# Patient Record
Sex: FEMALE | Race: WHITE | NOT HISPANIC OR LATINO | URBAN - METROPOLITAN AREA
[De-identification: names, ages, dates, MRNs, and addresses within clinical notes are randomized per-mention and may not be internally consistent; named-entity substitution may affect disease eponyms.]

---

## 2018-05-16 ENCOUNTER — INPATIENT (INPATIENT)
Facility: HOSPITAL | Age: 83
LOS: 0 days | Discharge: HOSPICE | End: 2018-05-17
Attending: INTERNAL MEDICINE | Admitting: INTERNAL MEDICINE
Payer: MEDICARE

## 2018-05-16 VITALS
SYSTOLIC BLOOD PRESSURE: 146 MMHG | RESPIRATION RATE: 22 BRPM | DIASTOLIC BLOOD PRESSURE: 92 MMHG | WEIGHT: 119.93 LBS | TEMPERATURE: 101 F | HEART RATE: 95 BPM | OXYGEN SATURATION: 92 % | HEIGHT: 60 IN

## 2018-05-16 LAB
ADD ON TEST-SPECIMEN IN LAB: SIGNIFICANT CHANGE UP
ALBUMIN SERPL ELPH-MCNC: 2.5 G/DL — LOW (ref 3.3–5)
ALP SERPL-CCNC: 61 U/L — SIGNIFICANT CHANGE UP (ref 40–120)
ALT FLD-CCNC: 14 U/L — SIGNIFICANT CHANGE UP (ref 12–78)
ANION GAP SERPL CALC-SCNC: 13 MMOL/L — SIGNIFICANT CHANGE UP (ref 5–17)
APPEARANCE UR: CLEAR — SIGNIFICANT CHANGE UP
APTT BLD: 31.2 SEC — SIGNIFICANT CHANGE UP (ref 27.5–37.4)
AST SERPL-CCNC: 32 U/L — SIGNIFICANT CHANGE UP (ref 15–37)
BASOPHILS # BLD AUTO: 0 K/UL — SIGNIFICANT CHANGE UP (ref 0–0.2)
BASOPHILS NFR BLD AUTO: 0 % — SIGNIFICANT CHANGE UP (ref 0–2)
BILIRUB SERPL-MCNC: 0.7 MG/DL — SIGNIFICANT CHANGE UP (ref 0.2–1.2)
BILIRUB UR-MCNC: NEGATIVE — SIGNIFICANT CHANGE UP
BUN SERPL-MCNC: 33 MG/DL — HIGH (ref 7–23)
CALCIUM SERPL-MCNC: 9.3 MG/DL — SIGNIFICANT CHANGE UP (ref 8.5–10.1)
CHLORIDE SERPL-SCNC: 95 MMOL/L — LOW (ref 96–108)
CO2 SERPL-SCNC: 20 MMOL/L — LOW (ref 22–31)
COLOR SPEC: YELLOW — SIGNIFICANT CHANGE UP
CREAT SERPL-MCNC: 1.18 MG/DL — SIGNIFICANT CHANGE UP (ref 0.5–1.3)
DIFF PNL FLD: (no result)
ELLIPTOCYTES BLD QL SMEAR: SLIGHT — SIGNIFICANT CHANGE UP
EOSINOPHIL # BLD AUTO: 0 K/UL — SIGNIFICANT CHANGE UP (ref 0–0.5)
EOSINOPHIL NFR BLD AUTO: 0 % — SIGNIFICANT CHANGE UP (ref 0–6)
GLUCOSE SERPL-MCNC: 120 MG/DL — HIGH (ref 70–99)
GLUCOSE UR QL: NEGATIVE MG/DL — SIGNIFICANT CHANGE UP
HCT VFR BLD CALC: 42.5 % — SIGNIFICANT CHANGE UP (ref 34.5–45)
HGB BLD-MCNC: 14.8 G/DL — SIGNIFICANT CHANGE UP (ref 11.5–15.5)
INR BLD: 1.42 RATIO — HIGH (ref 0.88–1.16)
KETONES UR-MCNC: NEGATIVE — SIGNIFICANT CHANGE UP
LACTATE SERPL-SCNC: 4.4 MMOL/L — CRITICAL HIGH (ref 0.7–2)
LEUKOCYTE ESTERASE UR-ACNC: (no result)
LIDOCAIN IGE QN: 43 U/L — LOW (ref 73–393)
LYMPHOCYTES # BLD AUTO: 1.55 K/UL — SIGNIFICANT CHANGE UP (ref 1–3.3)
LYMPHOCYTES # BLD AUTO: 3 % — LOW (ref 13–44)
MANUAL SMEAR VERIFICATION: SIGNIFICANT CHANGE UP
MCHC RBC-ENTMCNC: 30.4 PG — SIGNIFICANT CHANGE UP (ref 27–34)
MCHC RBC-ENTMCNC: 34.8 GM/DL — SIGNIFICANT CHANGE UP (ref 32–36)
MCV RBC AUTO: 87.3 FL — SIGNIFICANT CHANGE UP (ref 80–100)
MONOCYTES # BLD AUTO: 1.03 K/UL — HIGH (ref 0–0.9)
MONOCYTES NFR BLD AUTO: 2 % — SIGNIFICANT CHANGE UP (ref 2–14)
NEUTROPHILS # BLD AUTO: 48.52 K/UL — HIGH (ref 1.8–7.4)
NEUTROPHILS NFR BLD AUTO: 83 % — HIGH (ref 43–77)
NEUTS BAND # BLD: 11 % — HIGH (ref 0–8)
NITRITE UR-MCNC: NEGATIVE — SIGNIFICANT CHANGE UP
NRBC # BLD: 0 /100 — SIGNIFICANT CHANGE UP (ref 0–0)
NRBC # BLD: SIGNIFICANT CHANGE UP /100 WBCS (ref 0–0)
NT-PROBNP SERPL-SCNC: 9321 PG/ML — HIGH (ref 0–450)
PH UR: 5 — SIGNIFICANT CHANGE UP (ref 5–8)
PLAT MORPH BLD: NORMAL — SIGNIFICANT CHANGE UP
PLATELET # BLD AUTO: 369 K/UL — SIGNIFICANT CHANGE UP (ref 150–400)
POIKILOCYTOSIS BLD QL AUTO: SIGNIFICANT CHANGE UP
POTASSIUM SERPL-MCNC: 5.4 MMOL/L — HIGH (ref 3.5–5.3)
POTASSIUM SERPL-SCNC: 5.4 MMOL/L — HIGH (ref 3.5–5.3)
PROT SERPL-MCNC: 7.1 GM/DL — SIGNIFICANT CHANGE UP (ref 6–8.3)
PROT UR-MCNC: 100 MG/DL
PROTHROM AB SERPL-ACNC: 15.4 SEC — HIGH (ref 9.8–12.7)
RBC # BLD: 4.87 M/UL — SIGNIFICANT CHANGE UP (ref 3.8–5.2)
RBC # FLD: 13.2 % — SIGNIFICANT CHANGE UP (ref 10.3–14.5)
RBC BLD AUTO: (no result)
SCHISTOCYTES BLD QL AUTO: SLIGHT — SIGNIFICANT CHANGE UP
SODIUM SERPL-SCNC: 128 MMOL/L — LOW (ref 135–145)
SP GR SPEC: 1.02 — SIGNIFICANT CHANGE UP (ref 1.01–1.02)
TROPONIN I SERPL-MCNC: 0.03 NG/ML — SIGNIFICANT CHANGE UP (ref 0.01–0.04)
TSH SERPL-MCNC: 4.6 UIU/ML — HIGH (ref 0.36–3.74)
UROBILINOGEN FLD QL: NEGATIVE MG/DL — SIGNIFICANT CHANGE UP
VARIANT LYMPHS # BLD: 1 % — SIGNIFICANT CHANGE UP (ref 0–6)
WBC # BLD: 51.62 K/UL — CRITICAL HIGH (ref 3.8–10.5)
WBC # FLD AUTO: 51.62 K/UL — CRITICAL HIGH (ref 3.8–10.5)

## 2018-05-16 PROCEDURE — 99291 CRITICAL CARE FIRST HOUR: CPT

## 2018-05-16 PROCEDURE — 71045 X-RAY EXAM CHEST 1 VIEW: CPT | Mod: 26

## 2018-05-16 PROCEDURE — 93010 ELECTROCARDIOGRAM REPORT: CPT

## 2018-05-16 PROCEDURE — 74176 CT ABD & PELVIS W/O CONTRAST: CPT | Mod: 26

## 2018-05-16 PROCEDURE — 70450 CT HEAD/BRAIN W/O DYE: CPT | Mod: 26

## 2018-05-16 RX ORDER — SODIUM CHLORIDE 9 MG/ML
500 INJECTION INTRAMUSCULAR; INTRAVENOUS; SUBCUTANEOUS
Qty: 0 | Refills: 0 | Status: COMPLETED | OUTPATIENT
Start: 2018-05-16 | End: 2018-05-16

## 2018-05-16 RX ORDER — HYDROMORPHONE HYDROCHLORIDE 2 MG/ML
1 INJECTION INTRAMUSCULAR; INTRAVENOUS; SUBCUTANEOUS ONCE
Qty: 0 | Refills: 0 | Status: DISCONTINUED | OUTPATIENT
Start: 2018-05-16 | End: 2018-05-17

## 2018-05-16 RX ORDER — NITROFURANTOIN MACROCRYSTAL 50 MG
1 CAPSULE ORAL
Qty: 0 | Refills: 0 | COMMUNITY
Start: 2018-05-16

## 2018-05-16 RX ORDER — VANCOMYCIN HCL 1 G
1000 VIAL (EA) INTRAVENOUS ONCE
Qty: 0 | Refills: 0 | Status: COMPLETED | OUTPATIENT
Start: 2018-05-16 | End: 2018-05-16

## 2018-05-16 RX ORDER — ACETAMINOPHEN 500 MG
650 TABLET ORAL ONCE
Qty: 0 | Refills: 0 | Status: COMPLETED | OUTPATIENT
Start: 2018-05-16 | End: 2018-05-16

## 2018-05-16 RX ORDER — SODIUM CHLORIDE 9 MG/ML
3 INJECTION INTRAMUSCULAR; INTRAVENOUS; SUBCUTANEOUS ONCE
Qty: 0 | Refills: 0 | Status: COMPLETED | OUTPATIENT
Start: 2018-05-16 | End: 2018-05-16

## 2018-05-16 RX ORDER — CEFEPIME 1 G/1
1000 INJECTION, POWDER, FOR SOLUTION INTRAMUSCULAR; INTRAVENOUS ONCE
Qty: 0 | Refills: 0 | Status: COMPLETED | OUTPATIENT
Start: 2018-05-16 | End: 2018-05-16

## 2018-05-16 RX ADMIN — SODIUM CHLORIDE 3 MILLILITER(S): 9 INJECTION INTRAMUSCULAR; INTRAVENOUS; SUBCUTANEOUS at 20:47

## 2018-05-16 RX ADMIN — SODIUM CHLORIDE 2000 MILLILITER(S): 9 INJECTION INTRAMUSCULAR; INTRAVENOUS; SUBCUTANEOUS at 21:00

## 2018-05-16 RX ADMIN — SODIUM CHLORIDE 2000 MILLILITER(S): 9 INJECTION INTRAMUSCULAR; INTRAVENOUS; SUBCUTANEOUS at 20:50

## 2018-05-16 RX ADMIN — Medication 650 MILLIGRAM(S): at 21:08

## 2018-05-16 RX ADMIN — CEFEPIME 1000 MILLIGRAM(S): 1 INJECTION, POWDER, FOR SOLUTION INTRAMUSCULAR; INTRAVENOUS at 21:30

## 2018-05-16 RX ADMIN — Medication 250 MILLIGRAM(S): at 22:00

## 2018-05-16 RX ADMIN — SODIUM CHLORIDE 2000 MILLILITER(S): 9 INJECTION INTRAMUSCULAR; INTRAVENOUS; SUBCUTANEOUS at 20:47

## 2018-05-16 NOTE — ED PROVIDER NOTE - PROGRESS NOTE DETAILS
Tu DO: Patient with leukocytosis and bandemia; concern for severe sepsis; DNR/DNI- paperwork documented at bedside; to be admitted to step down unit, endorsed to hospitalist Dr. Robles for admission; CT scans pending at this time; patient aware that patient is critical at this time. AS:  call from radiology pt with closed loop obstruction with likely ischemic changes.  d/w family.  d/w dr begum, will send PA to see pt AS:  Extensive d/w pt family with surgery SHERRON Valentine at bedside as well.  Family has decided NOT to pursue surgical treatment for the patient as they understand the risks associated with surgical intervention.  Daughter is aware that patient's situation is dire and she will likely  either way.  Want pt comfort care only and given pain control/IV fluids for comfort.

## 2018-05-16 NOTE — ED PROVIDER NOTE - UNABLE TO OBTAIN
Unable to obtain a complete history secondary to pt's non-verbal status. Severe Illness/Injury Unable to obtain a complete ROS secondary to pt's non-verbal status.

## 2018-05-16 NOTE — ED ADULT TRIAGE NOTE - CHIEF COMPLAINT QUOTE
constipation, hasn't had a bowel movement in a week. given enema today with minimal output after. pt meets sepsis criteria. code sepsis initiated.

## 2018-05-16 NOTE — ED PROVIDER NOTE - CONSTITUTIONAL, MLM
normal... Well appearing, well nourished, awake, alert, and in no apparent distress. Pt is non-verbal at baseline.

## 2018-05-16 NOTE — ED PROVIDER NOTE - OBJECTIVE STATEMENT
88 y/o female with a PMHx of MS (wheel chair bound), frequent UTIs, non-verbal presents to the ED regarding fever today. Beginning yesterday morning, pt has been unable to communicate, in apparent pain when turned on her side, and unable to eat. This morning, at 10:30am pt had fever (Tmax 100.8). +recent weight loss over past 6 months. +constipation x1 week. Pt has a h/o abd pain with subsequent syncope in November 2017. Pt lives in [facility]. Allergic to shellfish, "sensitive" to Sulfa. History obtained from family at bedside. Unable to obtain a complete history secondary to pt's non-verbal status.

## 2018-05-16 NOTE — ED PROVIDER NOTE - MEDICAL DECISION MAKING DETAILS
88 y/o F with a h/o recurrent UTIs p/w fever, abd pain. Sepsis protocol initiated. Plan for EKG, CXR, CT head, CT abd/pelvis, UA, admit.

## 2018-05-16 NOTE — ED ADULT NURSE NOTE - OBJECTIVE STATEMENT
88 y/o Fever presents with fever, sepsis workup initiated. Pt is non verbal at baseline but able to let needs be known.

## 2018-05-17 VITALS
HEART RATE: 128 BPM | SYSTOLIC BLOOD PRESSURE: 120 MMHG | DIASTOLIC BLOOD PRESSURE: 75 MMHG | OXYGEN SATURATION: 96 % | TEMPERATURE: 98 F | RESPIRATION RATE: 35 BRPM

## 2018-05-17 LAB
ANION GAP SERPL CALC-SCNC: 12 MMOL/L — SIGNIFICANT CHANGE UP (ref 5–17)
BACTERIA # UR AUTO: (no result)
BASOPHILS # BLD AUTO: 0.02 K/UL — SIGNIFICANT CHANGE UP (ref 0–0.2)
BASOPHILS NFR BLD AUTO: 0 % — SIGNIFICANT CHANGE UP (ref 0–2)
BUN SERPL-MCNC: 36 MG/DL — HIGH (ref 7–23)
CALCIUM SERPL-MCNC: 8.5 MG/DL — SIGNIFICANT CHANGE UP (ref 8.5–10.1)
CHLORIDE SERPL-SCNC: 100 MMOL/L — SIGNIFICANT CHANGE UP (ref 96–108)
CO2 SERPL-SCNC: 18 MMOL/L — LOW (ref 22–31)
COMMENT - URINE: SIGNIFICANT CHANGE UP
CREAT SERPL-MCNC: 0.85 MG/DL — SIGNIFICANT CHANGE UP (ref 0.5–1.3)
EOSINOPHIL # BLD AUTO: 0 K/UL — SIGNIFICANT CHANGE UP (ref 0–0.5)
EOSINOPHIL NFR BLD AUTO: 0 % — SIGNIFICANT CHANGE UP (ref 0–6)
EPI CELLS # UR: SIGNIFICANT CHANGE UP
GLUCOSE SERPL-MCNC: 106 MG/DL — HIGH (ref 70–99)
HCT VFR BLD CALC: 41.9 % — SIGNIFICANT CHANGE UP (ref 34.5–45)
HGB BLD-MCNC: 14.1 G/DL — SIGNIFICANT CHANGE UP (ref 11.5–15.5)
IMM GRANULOCYTES NFR BLD AUTO: 1.5 % — SIGNIFICANT CHANGE UP (ref 0–1.5)
LACTATE SERPL-SCNC: 2 MMOL/L — SIGNIFICANT CHANGE UP (ref 0.7–2)
LACTATE SERPL-SCNC: 2.5 MMOL/L — HIGH (ref 0.7–2)
LYMPHOCYTES # BLD AUTO: 1.95 K/UL — SIGNIFICANT CHANGE UP (ref 1–3.3)
LYMPHOCYTES # BLD AUTO: 4.1 % — LOW (ref 13–44)
MCHC RBC-ENTMCNC: 30.5 PG — SIGNIFICANT CHANGE UP (ref 27–34)
MCHC RBC-ENTMCNC: 33.7 GM/DL — SIGNIFICANT CHANGE UP (ref 32–36)
MCV RBC AUTO: 90.7 FL — SIGNIFICANT CHANGE UP (ref 80–100)
MONOCYTES # BLD AUTO: 2.69 K/UL — HIGH (ref 0–0.9)
MONOCYTES NFR BLD AUTO: 5.6 % — SIGNIFICANT CHANGE UP (ref 2–14)
NEUTROPHILS # BLD AUTO: 42.6 K/UL — HIGH (ref 1.8–7.4)
NEUTROPHILS NFR BLD AUTO: 88.8 % — HIGH (ref 43–77)
NRBC # BLD: 0 /100 WBCS — SIGNIFICANT CHANGE UP (ref 0–0)
PLATELET # BLD AUTO: 301 K/UL — SIGNIFICANT CHANGE UP (ref 150–400)
POTASSIUM SERPL-MCNC: 5.5 MMOL/L — HIGH (ref 3.5–5.3)
POTASSIUM SERPL-SCNC: 5.5 MMOL/L — HIGH (ref 3.5–5.3)
RBC # BLD: 4.62 M/UL — SIGNIFICANT CHANGE UP (ref 3.8–5.2)
RBC # FLD: 13.2 % — SIGNIFICANT CHANGE UP (ref 10.3–14.5)
RBC CASTS # UR COMP ASSIST: (no result) /HPF (ref 0–4)
SODIUM SERPL-SCNC: 130 MMOL/L — LOW (ref 135–145)
WBC # BLD: 47.96 K/UL — CRITICAL HIGH (ref 3.8–10.5)
WBC # FLD AUTO: 47.96 K/UL — CRITICAL HIGH (ref 3.8–10.5)
WBC UR QL: (no result)

## 2018-05-17 RX ORDER — MORPHINE SULFATE 50 MG/1
2 CAPSULE, EXTENDED RELEASE ORAL
Qty: 0 | Refills: 0 | COMMUNITY
Start: 2018-05-17

## 2018-05-17 RX ORDER — DIVALPROEX SODIUM 500 MG/1
1 TABLET, DELAYED RELEASE ORAL
Qty: 0 | Refills: 0 | COMMUNITY

## 2018-05-17 RX ORDER — LEVOTHYROXINE SODIUM 125 MCG
12.5 TABLET ORAL AT BEDTIME
Qty: 0 | Refills: 0 | Status: DISCONTINUED | OUTPATIENT
Start: 2018-05-17 | End: 2018-05-17

## 2018-05-17 RX ORDER — MULTIVIT-MIN/FERROUS GLUCONATE 9 MG/15 ML
1 LIQUID (ML) ORAL
Qty: 0 | Refills: 0 | COMMUNITY

## 2018-05-17 RX ORDER — HEPARIN SODIUM 5000 [USP'U]/ML
5000 INJECTION INTRAVENOUS; SUBCUTANEOUS EVERY 12 HOURS
Qty: 0 | Refills: 0 | Status: DISCONTINUED | OUTPATIENT
Start: 2018-05-17 | End: 2018-05-17

## 2018-05-17 RX ORDER — RANITIDINE HYDROCHLORIDE 150 MG/1
1 TABLET, FILM COATED ORAL
Qty: 0 | Refills: 0 | COMMUNITY

## 2018-05-17 RX ORDER — MORPHINE SULFATE 50 MG/1
2 CAPSULE, EXTENDED RELEASE ORAL EVERY 4 HOURS
Qty: 0 | Refills: 0 | Status: DISCONTINUED | OUTPATIENT
Start: 2018-05-17 | End: 2018-05-17

## 2018-05-17 RX ORDER — SODIUM CHLORIDE 9 MG/ML
1000 INJECTION INTRAMUSCULAR; INTRAVENOUS; SUBCUTANEOUS
Qty: 0 | Refills: 0 | Status: DISCONTINUED | OUTPATIENT
Start: 2018-05-17 | End: 2018-05-17

## 2018-05-17 RX ORDER — ASPIRIN/CALCIUM CARB/MAGNESIUM 324 MG
1 TABLET ORAL
Qty: 0 | Refills: 0 | COMMUNITY

## 2018-05-17 RX ORDER — PANTOPRAZOLE SODIUM 20 MG/1
40 TABLET, DELAYED RELEASE ORAL DAILY
Qty: 0 | Refills: 0 | Status: DISCONTINUED | OUTPATIENT
Start: 2018-05-17 | End: 2018-05-17

## 2018-05-17 RX ORDER — OXYBUTYNIN CHLORIDE 5 MG
2 TABLET ORAL
Qty: 0 | Refills: 0 | COMMUNITY

## 2018-05-17 RX ORDER — CHOLECALCIFEROL (VITAMIN D3) 125 MCG
1 CAPSULE ORAL
Qty: 0 | Refills: 0 | COMMUNITY

## 2018-05-17 RX ORDER — LEVOTHYROXINE SODIUM 125 MCG
1 TABLET ORAL
Qty: 0 | Refills: 0 | COMMUNITY

## 2018-05-17 RX ORDER — CEFEPIME 1 G/1
1000 INJECTION, POWDER, FOR SOLUTION INTRAMUSCULAR; INTRAVENOUS EVERY 12 HOURS
Qty: 0 | Refills: 0 | Status: DISCONTINUED | OUTPATIENT
Start: 2018-05-17 | End: 2018-05-17

## 2018-05-17 RX ORDER — ACETAMINOPHEN 500 MG
1 TABLET ORAL
Qty: 0 | Refills: 0 | COMMUNITY

## 2018-05-17 RX ORDER — SENNOSIDES/DOCUSATE SODIUM 8.6MG-50MG
2 TABLET ORAL
Qty: 0 | Refills: 0 | COMMUNITY

## 2018-05-17 RX ORDER — POTASSIUM CHLORIDE 20 MEQ
1 PACKET (EA) ORAL
Qty: 0 | Refills: 0 | COMMUNITY

## 2018-05-17 RX ORDER — CEFEPIME 1 G/1
1000 INJECTION, POWDER, FOR SOLUTION INTRAMUSCULAR; INTRAVENOUS EVERY 24 HOURS
Qty: 0 | Refills: 0 | Status: DISCONTINUED | OUTPATIENT
Start: 2018-05-17 | End: 2018-05-17

## 2018-05-17 RX ADMIN — SODIUM CHLORIDE 2000 MILLILITER(S): 9 INJECTION INTRAMUSCULAR; INTRAVENOUS; SUBCUTANEOUS at 00:09

## 2018-05-17 RX ADMIN — HYDROMORPHONE HYDROCHLORIDE 1 MILLIGRAM(S): 2 INJECTION INTRAMUSCULAR; INTRAVENOUS; SUBCUTANEOUS at 00:09

## 2018-05-17 RX ADMIN — MORPHINE SULFATE 2 MILLIGRAM(S): 50 CAPSULE, EXTENDED RELEASE ORAL at 11:00

## 2018-05-17 RX ADMIN — Medication 1 MILLIGRAM(S): at 11:00

## 2018-05-17 RX ADMIN — SODIUM CHLORIDE 2000 MILLILITER(S): 9 INJECTION INTRAMUSCULAR; INTRAVENOUS; SUBCUTANEOUS at 00:01

## 2018-05-17 RX ADMIN — MORPHINE SULFATE 2 MILLIGRAM(S): 50 CAPSULE, EXTENDED RELEASE ORAL at 14:43

## 2018-05-17 RX ADMIN — SODIUM CHLORIDE 75 MILLILITER(S): 9 INJECTION INTRAMUSCULAR; INTRAVENOUS; SUBCUTANEOUS at 05:52

## 2018-05-17 RX ADMIN — HYDROMORPHONE HYDROCHLORIDE 1 MILLIGRAM(S): 2 INJECTION INTRAMUSCULAR; INTRAVENOUS; SUBCUTANEOUS at 00:39

## 2018-05-17 NOTE — DISCHARGE NOTE ADULT - PATIENT PORTAL LINK FT
You can access the [email or web address], offered by [facility], by registering with the following website: [email or web address]

## 2018-05-17 NOTE — DISCHARGE NOTE ADULT - HOSPITAL COURSE
86 y/o female with a PMHx of MS (wheel chair bound), frequent UTIs, non-verbal presents to the ED regarding fever today. Beginning yesterday morning, pt has been unable to communicate, in apparent pain when turned on her side, and unable to eat. This morning, at 10:30am pt had fever (Tmax 100.8). +weight loss over past 6 months. +constipation x1 week. Pt has a h/o abd pain with subsequent syncope in November 2017. Pt lives in [facility]. Allergic to shellfish, "sensitive" to Sulfa. History obtained from family at bedside. Unable to obtain a complete history secondary to pt's non-verbal status.   -found to have advanced closed loop SBO; family declined surgery and  opted for palliative care            REVIEW OF SYSTEMS:  unable to obtain due to AMS    All other review of systems is negative unless indicated above    Vital Signs Last 24 Hrs  T(C): 36.7 (17 May 2018 10:31), Max: 38.3 (16 May 2018 21:05)  T(F): 98.1 (17 May 2018 10:31), Max: 101 (16 May 2018 21:05)  HR: 128 (17 May 2018 10:31) (95 - 128)  BP: 120/75 (17 May 2018 10:31) (103/73 - 146/92)  BP(mean): --  RR: 35 (17 May 2018 10:31) (21 - 40)  SpO2: 96% (17 May 2018 10:31) (92% - 98%)    PHYSICAL EXAM:    GENERAL: NAD, Well nourished  HEENT:  NC/AT, EOMI, PERRLA, No scleral icterus, Moist mucous membranes  NECK: Supple, No JVD  CNS:  Alert & Oriented X3, Motor Strength 5/5 B/L upper and lower extremities; DTRs 2+ intact   LUNG: Normal Breath sounds, Clear to auscultation bilaterally, No rales, No rhonchi, No wheezing  HEART: RRR; No murmurs, No rubs  ABDOMEN: +BS, ST/ND/NT  GENITOURINARY: Voiding, Bladder not distended  EXTREMITIES:  2+ Peripheral Pulses, No clubbing, No cyanosis, No tibial edema  MUSCULOSKELTAL: Joints normal ROM, No TTP, No effusion  VAGINAL: deferred  SKIN: no rashes  RECTAL: deferred, not indicated  BREAST: deferred                          14.1   47.96 )-----------( 301      ( 17 May 2018 05:43 )             41.9     05-17    130<L>  |  100  |  36<H>  ----------------------------<  106<H>  5.5<H>   |  18<L>  |  0.85        A/P:    1. Advanced SBO:  2. Toxic metabolic encephalopathy  3. MS  4. Sepsis due to SBO    d/c to hospice house

## 2018-05-17 NOTE — H&P ADULT - ASSESSMENT
86 yo female presented with fever and ? abdominal pain.    A/P:    1.  Sever Sepsis  Small bowel Obstruction  Elevated WBC  Elevated lactate  -got IVF and on IVF  -on IV antibiotics  -keep NPO  -follow clinically  -family members does not want any surgery, prefers comfort care after initial antibiotics in hospital     2.  Electrolyte imbalance  -will follow after IVF    3.  Hypothyroidism  -on Levothyroxine     4.  Heparin for DVT ppx     5.  Patient is DNR/DNI status.

## 2018-05-17 NOTE — DISCHARGE NOTE ADULT - CARE PLAN
Principal Discharge DX:	Leukocytosis  Goal:	comfort care  Assessment and plan of treatment:	palliative care  Secondary Diagnosis:	MS (multiple sclerosis)

## 2018-05-17 NOTE — H&P ADULT - NSHPPHYSICALEXAM_GEN_ALL_CORE
Vital Signs Last 24 Hrs  T(C): 38.3 (16 May 2018 21:05), Max: 38.3 (16 May 2018 21:05)  T(F): 101 (16 May 2018 21:05), Max: 101 (16 May 2018 21:05)  HR: 105 (16 May 2018 22:15) (95 - 125)  BP: 145/75 (16 May 2018 22:15) (139/90 - 146/92)  RR: 35 (16 May 2018 22:15) (22 - 40)  SpO2: 95% (16 May 2018 22:15) (92% - 97%)

## 2018-05-17 NOTE — CONSULT NOTE ADULT - SUBJECTIVE AND OBJECTIVE BOX
Patient is a 87y old  Female who presents with a chief complaint of abdominal pain and vomiting since yesterday    HPI:  The pt is an 88 y/o female with a PMHx of MS (wheel chair bound), frequent UTIs, non-verbal presents to the ED regarding fever today. Beginning yesterday morning, pt has been unable to communicate, in apparent pain when turned on her side, and unable to eat. This morning, at 10:30am pt had fever (Tmax 100.8). +constipation x1 week. Pt has a h/o abd pain with subsequent syncope in November 2017 +recent weight loss over past 6 months with difficulty swallowing pt is assisted with meals and mostly eats soups.  Pt lives in [facility]. Allergic to shellfish, "sensitive" to Sulfa. History obtained from family at bedside. Unable to obtain a complete history secondary to pt's non-verbal status.      PAST MEDICAL & SURGICAL HISTORY:  MS (multiple sclerosis)      MEDICATIONS  (STANDING):      PHYSICAL EXAM:  T(C): 38.3 (05-16-18 @ 21:05), Max: 38.3 (05-16-18 @ 21:05)  HR: 105 (05-16-18 @ 22:15) (95 - 125)  BP: 145/75 (05-16-18 @ 22:15) (139/90 - 146/92)  RR: 35 (05-16-18 @ 22:15) (22 - 40)  SpO2: 95% (05-16-18 @ 22:15) (92% - 97%)  Wt(kg): --    Skin:  warm and dry    HEENT:  NC/AT, PERRLA, no otorrhea, nares patent, buccal mucosa pink and moist    Neck:  non tender to palpation, trachea midline, no JVD    Respiratory: Lungs clear and equal bilat, no r/r/w    Cardiovascular:  S1S2 reg, no M    Gastrointestinal:  +distention, absent BS, very tender to light palpation, +guarding    Extremities:  no c/c/e    Vascular:  2+/4+ bilateral    Neurological:  awake and alert, CNII-XII grossly intact                            14.8   51.62 )-----------( 369      ( 16 May 2018 20:49 )             42.5       05-16    128<L>  |  95<L>  |  33<H>  ----------------------------<  120<H>  5.4<H>   |  20<L>  |  1.18    Ca    9.3      16 May 2018 20:49    TPro  7.1  /  Alb  2.5<L>  /  TBili  0.7  /  DBili  x   /  AST  32  /  ALT  14  /  AlkPhos  61  05-16    EXAM:  CT ABDOMEN AND PELVIS                        PROCEDURE DATE:  05/16/2018    IMPRESSION:    Complex small bowel obstruction with at least two transition points in   the pelvis. Evaluation is markedly limited due to lack of intravenous and   oral contrast. There is moderate volume interloop edema. A closed loop   small bowel obstruction cannot be excluded.    Small volume complex perihepatic, perisplenic and pelvic ascites.     EXAM:  CT BRAIN                        PROCEDURE DATE:  05/16/2018    IMPRESSION:   No evidence of acute intracranial hemorrhage, midline shift or CT   evidence of acute territorial infarct.

## 2018-05-17 NOTE — ED ADULT NURSE REASSESSMENT NOTE - NS ED NURSE REASSESS COMMENT FT1
Patient appears uncomfortable. Patient medicated with morphine and ativan. Telemetry shows SVT in 160's. Family at bedside. Spoke with Dr. Shannon about patient's status and hospice consideration. Dr. Shannon to be in shortly to talk with family. Spoke with Joleen Corona about hospice. She will be in to talk with family as well.

## 2018-05-17 NOTE — DISCHARGE NOTE ADULT - MEDICATION SUMMARY - MEDICATIONS TO STOP TAKING
I will STOP taking the medications listed below when I get home from the hospital:    Aspirin Enteric Coated 81 mg oral delayed release tablet  -- 1 tab(s) by mouth once a day    Depakote  mg oral tablet, extended release  -- 1 tab(s) by mouth once a day    I-Jacque oral tablet  -- 1 tab(s) by mouth once a day    Macrobid 100 mg oral capsule  -- 1 cap(s) by mouth 2 times a day    oxybutynin 5 mg oral tablet  -- 2 tab(s) by mouth 2 times a day    potassium chloride 20 mEq oral tablet, extended release  -- 1 tab(s) by mouth once a day    Senna Plus 50 mg-8.6 mg oral tablet  -- 2 tab(s) by mouth 2 times a day    Synthroid 25 mcg (0.025 mg) oral tablet  -- 1 tab(s) by mouth once a day    acetaminophen 500 mg oral tablet  -- 1 tab(s) by mouth once a day    cholecalciferol 5000 intl units oral tablet  -- 1 tab(s) by mouth once a day    Zantac 150 oral tablet  -- 1 tab(s) by mouth once a day

## 2018-05-17 NOTE — DISCHARGE NOTE ADULT - MEDICATION SUMMARY - MEDICATIONS TO TAKE
I will START or STAY ON the medications listed below when I get home from the hospital:    morphine  -- 2 milligram(s) injectable every 2 hours, As Needed for distress or pain  -- Indication: For pain    LORazepam  -- 1 milligram(s) intravenous every 4 hours, As Needed for agitation  -- Indication: For agitation

## 2018-05-17 NOTE — H&P ADULT - HISTORY OF PRESENT ILLNESS
88 y/o female with a PMHx of MS (wheel chair bound), frequent UTIs, non-verbal presents to the ED regarding fever today. Beginning yesterday morning, pt has been unable to communicate, in apparent pain when turned on her side, and unable to eat. This morning, at 10:30am pt had fever (Tmax 100.8). +weight loss over past 6 months. +constipation x1 week. Pt has a h/o abd pain with subsequent syncope in November 2017. Pt lives in [facility]. Allergic to shellfish, "sensitive" to Sulfa. History obtained from family at bedside. Unable to obtain a complete history secondary to pt's non-verbal status.

## 2018-05-17 NOTE — CONSULT NOTE ADULT - ASSESSMENT
Ass:  Closed loop small bowel obstruction  Plan:  As per family's request, there will be no surgical intervention at this time.  It was explained to the pt's daughter, her wife, and the pt's aide of 17yrs that the pt would not survive without surgical intervention.  The surgery required was explained at length as an Exploratory Laparotomy, Lysis of Adhesions, possible small bowel resection, possible ostomy.  It was also explained that there is a possibility that the pt would not survive the surgery.   The pt's daughter requests that her mother be made comfortable at this time.  Dr. Garza was made aware of all of the above. Ass:  Closed loop small bowel obstruction  Plan:  As per family's request, there will be no surgical intervention at this time.  It was explained to the pt's daughter, her wife, and the pt's aide of 17yrs that the pt would not survive without surgical intervention.  The surgery required was explained at length as an Exploratory Laparotomy, Lysis of Adhesions, possible small bowel resection, possible ostomy.  It was also explained that there is a possibility that the pt would not survive the surgery.   The pt's daughter requests that her mother be made comfortable at this time.  Dr. Garza was aware of all of the above.

## 2018-05-18 LAB
CULTURE RESULTS: NO GROWTH — SIGNIFICANT CHANGE UP
SPECIMEN SOURCE: SIGNIFICANT CHANGE UP

## 2018-05-22 LAB
CULTURE RESULTS: SIGNIFICANT CHANGE UP
CULTURE RESULTS: SIGNIFICANT CHANGE UP
SPECIMEN SOURCE: SIGNIFICANT CHANGE UP
SPECIMEN SOURCE: SIGNIFICANT CHANGE UP

## 2018-05-29 DIAGNOSIS — Z51.5 ENCOUNTER FOR PALLIATIVE CARE: ICD-10-CM

## 2018-05-29 DIAGNOSIS — E87.8 OTHER DISORDERS OF ELECTROLYTE AND FLUID BALANCE, NOT ELSEWHERE CLASSIFIED: ICD-10-CM

## 2018-05-29 DIAGNOSIS — K56.609 UNSPECIFIED INTESTINAL OBSTRUCTION, UNSPECIFIED AS TO PARTIAL VERSUS COMPLETE OBSTRUCTION: ICD-10-CM

## 2018-05-29 DIAGNOSIS — Z66 DO NOT RESUSCITATE: ICD-10-CM

## 2018-05-29 DIAGNOSIS — Z53.29 PROCEDURE AND TREATMENT NOT CARRIED OUT BECAUSE OF PATIENT'S DECISION FOR OTHER REASONS: ICD-10-CM

## 2018-05-29 DIAGNOSIS — G35 MULTIPLE SCLEROSIS: ICD-10-CM

## 2018-05-29 DIAGNOSIS — G92 TOXIC ENCEPHALOPATHY: ICD-10-CM

## 2018-05-29 DIAGNOSIS — R50.9 FEVER, UNSPECIFIED: ICD-10-CM

## 2018-05-29 DIAGNOSIS — E03.9 HYPOTHYROIDISM, UNSPECIFIED: ICD-10-CM

## 2018-05-29 DIAGNOSIS — Z99.3 DEPENDENCE ON WHEELCHAIR: ICD-10-CM

## 2018-05-29 DIAGNOSIS — Z87.440 PERSONAL HISTORY OF URINARY (TRACT) INFECTIONS: ICD-10-CM

## 2018-05-29 DIAGNOSIS — A41.9 SEPSIS, UNSPECIFIED ORGANISM: ICD-10-CM

## 2018-05-29 DIAGNOSIS — D72.829 ELEVATED WHITE BLOOD CELL COUNT, UNSPECIFIED: ICD-10-CM

## 2020-07-23 NOTE — H&P ADULT - PSH
PATIENT:CARI BLAKE                      DATE OF SERVICE: 07/21/20
SEX: F                                  MEDICAL RECORD: [id number]
DATE OF BIRTH: [birth year]                        LOCATION:D.M2      D.211
AGE OF PATIENT: 79                             ADMISSION DATE: 07/21/20
 
REFERRING PHYSICIAN:                               
 
INTERPRETING PHYSICIAN: DIANE SELLERS MD              
 
 
 
                             ECHOCARDIOGRAM REPORT
  ECHO CHARGES 4               ECHO COMPLETE                 Date: 07/21/20
 
 
 
CLINICAL DIAGNOSIS: NSTEMI                        
 
                         ECHOCARDIOGRAPHIC MEASUREMENTS
      (adult normal given)
   AC root (d.<3.7cm) 3.2  cm   LV Septum d (<1.2 cm> 0.7  cm
      Valve Excursion 1.9  cm     LV Septum (systole) 1.1  cm
Left Atria (s.<4.0cm> 3.7  cm          LVPW d(<1.2cm) 1.0  cm
        RV (d.<2.3cm) 2.0  cm           LVPW (sytole) 1.5  cm
  LV diastole(<5.6CM) 7.3  cm       MV E-F(>70mm/sec)      cm
           LV systole 5.9  cm           LVOT Diameter 2.0  cm
       MV exc.(>10mm)      cm
Est.ejection fraction (50-75%)     %
 
   DOPPLER:
     LVIT      cm/sec A 80   cm/sec E 72    cm/sec
       LA      cm/sec      RVSP 30.9 mmHg
     LVOT 77   cm/sec   AOP1/2T      m/s
  Asc. Ao 137  cm/sec
     RVOT 60   cm/sec
       RA      cm/sec
       PA 67   cm/sec
 AV Gradient Peak 7.5  mmHg  AV Mean 4.3  mmHg  AV Area 1.6  cm
 MV Gradient Peak 2.1  mmHg  MV Mean 1.4  mmHg  MV Area      cm
   COMMENTS:                                              
 
 
 Cardiac Sonographer: Nichole ZALDIVAR Monroe             
      Cardiologist: Denise Sellers               
             TAPE# [email or web address]           
                                       Pericardial Effusion N                        
 
 
DATE OF SERVICE:  
 
PROCEDURE:  Transthoracic echocardiogram.
 
FINDINGS:
1.  Left ventricle is mildly dilated.  Ejection fraction is 40% with anterior
septal hypokinesis.
2.  Left atrium is normal size, shape, and function.
3.  Aortic valve is normal.
4.  Mitral valve has trace mitral regurgitation, otherwise normal.
 
 
 
ECHOCARDIOGRAM REPORT                          [id number]    CARI BLAKE              
 
 
5.  Tricuspid valve has trace tricuspid regurgitation.  RVSP is 30.9 mmHg.
6.  The right ventricle is normal size, shape, structure, and function.
7.  Right atrium is normal.
 
IMPRESSION:  The patient has regional wall motion abnormalities suggestive of
ischemic heart disease with ejection fraction around 40%.
 
NTS:[id number] Voice Confirmation ID: [id number] DOCUMENT ID: [id number]
                                           
                                           DIANE SELLERS MD              
 
 
 
Electronically Signed by DIANE SELLERS on 07/23/20 at 0732
 
 
 
 
 
 
 
 
 
 
 
 
 
 
 
 
 
 
 
 
 
 
 
 
 
 
 
 
 
 
 
 
CC:                                                             [id number]
DICTATION DATE: 07/22/20 1247     :     07/22/20 2127      DIS IN  
                                                                      07/22/20
[place] [id number] [place] [id number]
No significant past surgical history